# Patient Record
Sex: FEMALE | Race: WHITE | Employment: FULL TIME | URBAN - METROPOLITAN AREA
[De-identification: names, ages, dates, MRNs, and addresses within clinical notes are randomized per-mention and may not be internally consistent; named-entity substitution may affect disease eponyms.]

---

## 2018-10-02 ENCOUNTER — HOSPITAL ENCOUNTER (OUTPATIENT)
Dept: MAMMOGRAPHY | Age: 58
Discharge: HOME | End: 2018-10-02
Attending: FAMILY MEDICINE
Payer: MEDICARE

## 2018-10-02 DIAGNOSIS — Z12.31 VISIT FOR SCREENING MAMMOGRAM: ICD-10-CM

## 2018-10-02 PROCEDURE — 77067 SCR MAMMO BI INCL CAD: CPT

## 2019-10-25 ENCOUNTER — HOSPITAL ENCOUNTER (OUTPATIENT)
Dept: MAMMOGRAPHY | Age: 59
Discharge: HOME | End: 2019-10-25
Attending: FAMILY MEDICINE
Payer: COMMERCIAL

## 2019-10-25 DIAGNOSIS — Z12.31 VISIT FOR SCREENING MAMMOGRAM: ICD-10-CM

## 2019-10-25 PROCEDURE — 77067 SCR MAMMO BI INCL CAD: CPT

## 2021-05-24 ENCOUNTER — NURSE TRIAGE (OUTPATIENT)
Dept: OTHER | Facility: CLINIC | Age: 61
End: 2021-05-24

## 2021-05-24 NOTE — TELEPHONE ENCOUNTER
Call received on [address]. Brief description of triage: c/o SOB with exertion and fatigue. Pt was exposed to COVID 3 weeks ago but states she has had SOB in past due to Anemia and was due for labs to be drawn. Pt states she is trying to establish care with new provider. See assessment below. Triage indicates for patient to be seen in the next 3 days or be seen by ED or UCC. Care advice provided. Recommended using local department of health website for testing locations and up to date information. Caller verbalizes understanding, denies any other questions or concerns; instructed to call back for any new or worsening symptoms. Reason for Disposition   [1] Difficulty breathing (shortness of breath) occurs AND [2] onset > 14 days after COVID-19 EXPOSURE (Close Contact)   MODERATE longstanding difficulty breathing (e.g., speaks in phrases, SOB even at rest, pulse 100-120) and SAME as normal    Answer Assessment - Initial Assessment Questions  1. COVID-19 CLOSE CONTACT: \"Who is the person with the confirmed or suspected COVID-19 infection that you were exposed to?\"     Pt states neighbor    2. PLACE of CONTACT: \"Where were you when you were exposed to COVID-19? \" (e.g., home, school, medical waiting room; which city?)      Out side    3. TYPE of CONTACT: \"How much contact was there? \" (e.g., sitting next to, live in same house, work in same office, same building)      Pt states was close to pt within 3 minutes    4. DURATION of CONTACT: \"How long were you in contact with the COVID-19 patient? \" (e.g., a few seconds, passed by person, a few minutes, 15 minutes or longer, live with the patient)      Less than 15 minutes    5. MASK: \"Were you wearing a mask? \" \"Was the other person wearing a mask? \" Note: wearing a mask reduces the risk of an otherwise close contact. Pt denies    6. DATE of CONTACT: \"When did you have contact with a COVID-19 patient? \" (e.g., how many days ago)      Pt states about 3 weeks    7. COMMUNITY SPREAD: \"Are there lots of cases of COVID-19 (community spread) where you live? \" (See public health department website, if unsure)        Pt denies or is unusre    8. SYMPTOMS: \"Do you have any symptoms? \" (e.g., fever, cough, breathing difficulty, loss of taste or smell)      SOB and fatigue    9. PREGNANCY OR POSTPARTUM: \"Is there any chance you are pregnant? \" \"When was your last menstrual period? \" \"Did you deliver in the last 2 weeks? \"      Na    10. HIGH RISK: \"Do you have any heart or lung problems? \" \"Do you have a weak immune system? \" (e.g., heart failure, COPD, asthma, HIV positive, chemotherapy, renal failure, diabetes mellitus, sickle cell anemia, obesity)        Hx of anemia    11. TRAVEL: \"Have you traveled out of the country recently? \" If so, \"When and where? \" Also ask about out-of-state travel, since the [facility] has identified some high-risk cities for community spread in the [address] [place] [address] [address]. Note: Travel becomes less relevant if there is widespread community transmission where the patient lives.         Denies    Protocols used: CORONAVIRUS (COVID-19) EXPOSURE-ADULT-[place], BREATHING DIFFICULTY-ADULT-[place]

## 2021-06-28 ENCOUNTER — TRANSCRIBE ORDER (OUTPATIENT)
Dept: SCHEDULING | Age: 61
End: 2021-06-28

## 2021-06-28 ENCOUNTER — HOSPITAL ENCOUNTER (OUTPATIENT)
Dept: MAMMOGRAPHY | Age: 61
Discharge: HOME | End: 2021-06-28
Attending: FAMILY MEDICINE
Payer: COMMERCIAL

## 2021-06-28 DIAGNOSIS — Z12.31 SCREENING MAMMOGRAM FOR HIGH-RISK PATIENT: Primary | ICD-10-CM

## 2021-06-28 DIAGNOSIS — Z12.31 SCREENING MAMMOGRAM FOR HIGH-RISK PATIENT: ICD-10-CM

## 2021-06-28 PROCEDURE — 77067 SCR MAMMO BI INCL CAD: CPT

## 2021-11-21 NOTE — PERIOP NOTES
First name stated on voicemail; Left generalized message regarding COVID requirements, a COVID test needs to be completed in order to proceed with procedures at [facility]. Left message regarding curbside COVID swabbing at [facility] Monday, November 29th from [id number]. Call [facility] Endoscopy at [phone] Monday thru Friday with questions or concerns.

## 2021-11-22 NOTE — PERIOP NOTES
First name stated on voicemail; Left generalized message regarding COVID requirements, a COVID test needs to be completed in order to proceed with procedures at [facility]. Left message regarding curbside COVID swabbing at [facility] Monday, November 29th from [id number]. Call [facility] Endoscopy at [phone] Monday thru Friday with questions or concerns.

## 2021-11-29 ENCOUNTER — TRANSCRIBE ORDER (OUTPATIENT)
Dept: REGISTRATION | Age: 61
End: 2021-11-29

## 2021-11-29 ENCOUNTER — HOSPITAL ENCOUNTER (OUTPATIENT)
Dept: LAB | Age: 61
Discharge: HOME | End: 2021-11-29
Payer: COMMERCIAL

## 2021-11-29 DIAGNOSIS — Z01.812 PRE-PROCEDURAL LABORATORY EXAMINATIONS: ICD-10-CM

## 2021-11-29 DIAGNOSIS — Z01.812 PRE-PROCEDURAL LABORATORY EXAMINATIONS: Primary | ICD-10-CM

## 2021-11-29 PROCEDURE — U0005 INFEC AGEN DETEC AMPLI PROBE: HCPCS

## 2021-11-30 LAB
SARS-COV-2, XPLCVT: NOT DETECTED
SOURCE, COVRS: NORMAL

## 2021-12-01 ENCOUNTER — ANESTHESIA EVENT (OUTPATIENT)
Dept: ENDOSCOPY | Age: 61
End: 2021-12-01
Payer: COMMERCIAL

## 2021-12-01 ENCOUNTER — HOSPITAL ENCOUNTER (OUTPATIENT)
Age: 61
Setting detail: OUTPATIENT SURGERY
Discharge: HOME | End: 2021-12-01
Attending: INTERNAL MEDICINE | Admitting: INTERNAL MEDICINE
Payer: COMMERCIAL

## 2021-12-01 ENCOUNTER — ANESTHESIA (OUTPATIENT)
Dept: ENDOSCOPY | Age: 61
End: 2021-12-01
Payer: COMMERCIAL

## 2021-12-01 VITALS
OXYGEN SATURATION: 100 % | HEIGHT: 64 IN | HEART RATE: 74 BPM | DIASTOLIC BLOOD PRESSURE: 77 MMHG | RESPIRATION RATE: 11 BRPM | WEIGHT: 156.31 LBS | BODY MASS INDEX: 26.69 KG/M2 | SYSTOLIC BLOOD PRESSURE: 118 MMHG | TEMPERATURE: 97.6 F

## 2021-12-01 PROCEDURE — 74011250636 HC RX REV CODE- 250/636: Performed by: NURSE ANESTHETIST, CERTIFIED REGISTERED

## 2021-12-01 PROCEDURE — 76060000031 HC ANESTHESIA FIRST 0.5 HR: Performed by: INTERNAL MEDICINE

## 2021-12-01 PROCEDURE — 88305 TISSUE EXAM BY PATHOLOGIST: CPT

## 2021-12-01 PROCEDURE — 74011000250 HC RX REV CODE- 250: Performed by: NURSE ANESTHETIST, CERTIFIED REGISTERED

## 2021-12-01 PROCEDURE — 77030021593 HC FCPS BIOP ENDOSC BSC -A: Performed by: INTERNAL MEDICINE

## 2021-12-01 PROCEDURE — 74011250636 HC RX REV CODE- 250/636: Performed by: INTERNAL MEDICINE

## 2021-12-01 PROCEDURE — 77030011992 HC AIRWY NASOPHGL TELE -A: Performed by: STUDENT IN AN ORGANIZED HEALTH CARE EDUCATION/TRAINING PROGRAM

## 2021-12-01 PROCEDURE — 76040000019: Performed by: INTERNAL MEDICINE

## 2021-12-01 PROCEDURE — 2709999900 HC NON-CHARGEABLE SUPPLY: Performed by: INTERNAL MEDICINE

## 2021-12-01 RX ORDER — ATROPINE SULFATE 0.1 MG/ML
0.5 INJECTION INTRAVENOUS
Status: DISCONTINUED | OUTPATIENT
Start: 2021-12-01 | End: 2021-12-01 | Stop reason: HOSPADM

## 2021-12-01 RX ORDER — MIDAZOLAM HYDROCHLORIDE 1 MG/ML
.25-5 INJECTION, SOLUTION INTRAMUSCULAR; INTRAVENOUS
Status: DISCONTINUED | OUTPATIENT
Start: 2021-12-01 | End: 2021-12-01 | Stop reason: HOSPADM

## 2021-12-01 RX ORDER — DEXTROMETHORPHAN/PSEUDOEPHED 2.5-7.5/.8
1.2 DROPS ORAL
Status: DISCONTINUED | OUTPATIENT
Start: 2021-12-01 | End: 2021-12-01 | Stop reason: HOSPADM

## 2021-12-01 RX ORDER — PROPOFOL 10 MG/ML
INJECTION, EMULSION INTRAVENOUS AS NEEDED
Status: DISCONTINUED | OUTPATIENT
Start: 2021-12-01 | End: 2021-12-01 | Stop reason: HOSPADM

## 2021-12-01 RX ORDER — FAMOTIDINE 40 MG/1
40 TABLET, FILM COATED ORAL DAILY
COMMUNITY

## 2021-12-01 RX ORDER — SODIUM CHLORIDE 9 MG/ML
50 INJECTION, SOLUTION INTRAVENOUS CONTINUOUS
Status: DISCONTINUED | OUTPATIENT
Start: 2021-12-01 | End: 2021-12-01 | Stop reason: HOSPADM

## 2021-12-01 RX ORDER — ESOMEPRAZOLE MAGNESIUM 20 MG/1
FOR SUSPENSION ORAL DAILY
COMMUNITY

## 2021-12-01 RX ORDER — ESCITALOPRAM OXALATE 5 MG/1
5 TABLET ORAL DAILY
COMMUNITY

## 2021-12-01 RX ORDER — PROPOFOL 10 MG/ML
INJECTION, EMULSION INTRAVENOUS
Status: DISCONTINUED | OUTPATIENT
Start: 2021-12-01 | End: 2021-12-01 | Stop reason: HOSPADM

## 2021-12-01 RX ORDER — CETIRIZINE HCL 10 MG
TABLET ORAL DAILY
COMMUNITY

## 2021-12-01 RX ORDER — FENTANYL CITRATE 50 UG/ML
100 INJECTION, SOLUTION INTRAMUSCULAR; INTRAVENOUS
Status: DISCONTINUED | OUTPATIENT
Start: 2021-12-01 | End: 2021-12-01 | Stop reason: HOSPADM

## 2021-12-01 RX ORDER — LIDOCAINE HYDROCHLORIDE 20 MG/ML
INJECTION, SOLUTION EPIDURAL; INFILTRATION; INTRACAUDAL; PERINEURAL AS NEEDED
Status: DISCONTINUED | OUTPATIENT
Start: 2021-12-01 | End: 2021-12-01 | Stop reason: HOSPADM

## 2021-12-01 RX ORDER — NALOXONE HYDROCHLORIDE 0.4 MG/ML
0.4 INJECTION, SOLUTION INTRAMUSCULAR; INTRAVENOUS; SUBCUTANEOUS
Status: DISCONTINUED | OUTPATIENT
Start: 2021-12-01 | End: 2021-12-01 | Stop reason: HOSPADM

## 2021-12-01 RX ORDER — FLUMAZENIL 0.1 MG/ML
0.2 INJECTION INTRAVENOUS
Status: DISCONTINUED | OUTPATIENT
Start: 2021-12-01 | End: 2021-12-01 | Stop reason: HOSPADM

## 2021-12-01 RX ORDER — SODIUM CHLORIDE 9 MG/ML
INJECTION, SOLUTION INTRAVENOUS
Status: DISCONTINUED | OUTPATIENT
Start: 2021-12-01 | End: 2021-12-01 | Stop reason: HOSPADM

## 2021-12-01 RX ORDER — EPINEPHRINE 0.1 MG/ML
1 INJECTION INTRACARDIAC; INTRAVENOUS
Status: DISCONTINUED | OUTPATIENT
Start: 2021-12-01 | End: 2021-12-01 | Stop reason: HOSPADM

## 2021-12-01 RX ADMIN — FENTANYL CITRATE 50 MCG: 50 INJECTION, SOLUTION INTRAMUSCULAR; INTRAVENOUS at 08:08

## 2021-12-01 RX ADMIN — PROPOFOL 140 MCG/KG/MIN: 10 INJECTION, EMULSION INTRAVENOUS at 08:09

## 2021-12-01 RX ADMIN — LIDOCAINE HYDROCHLORIDE 20 MG: 20 INJECTION, SOLUTION INTRAVENOUS at 08:12

## 2021-12-01 RX ADMIN — SODIUM CHLORIDE 50 ML/HR: 9 INJECTION, SOLUTION INTRAVENOUS at 07:51

## 2021-12-01 RX ADMIN — LIDOCAINE HYDROCHLORIDE 80 MG: 20 INJECTION, SOLUTION INTRAVENOUS at 08:10

## 2021-12-01 RX ADMIN — PROPOFOL INJECTABLE EMULSION 20 MG: 10 INJECTION, EMULSION INTRAVENOUS at 08:11

## 2021-12-01 RX ADMIN — SODIUM CHLORIDE: 9 INJECTION, SOLUTION INTRAVENOUS at 07:30

## 2021-12-01 RX ADMIN — PROPOFOL INJECTABLE EMULSION 50 MG: 10 INJECTION, EMULSION INTRAVENOUS at 08:10

## 2021-12-01 NOTE — PERIOP NOTES
Care of patient assumed from the anesthesia provider. Patient tolerated procedure well. Abdomen remains soft and non tender post procedure, no complaints or indication of discomfort noted at this time. See anesthesia note.     Endoscope was pre-cleaned at bedside immediately following procedure by DIALLO Haider

## 2021-12-01 NOTE — H&P
Patient Name: David Rea  2021  Gender: Female   (age): 1960 (62)      [facility]  [address] [facility] [address]  [phone] (phone)  [phone] (fax)    Chief Complaint:    anemia, constipation, diarrhea     History of Present Illness:            Personal history includes anemia with recent hemoglobin 9.7. Long-term she has symptoms including that of alternating diarrhea, inability to initiate a bowel movement; she has not had fever, vomiting, visible blood loss. She has no history of regular use of anti-inflammatories including an absence of low-dose aspirin. Additional history includes that of indigestion, heartburn, regurgitation; she has been symptomatic still in spite of daily esomeprazole; finds significant improvement with the addition of famotidine. Currently takes both esomeprazole and famotidine daily    Past Medical History  Medical Conditions: Allergic rhinitis  GERD/Hiatal Hernia  High cholesterol  Surgical Procedures:   hernia  Tubal Ligation  Tonsillectomy  D and C  Laparoscopy  Dx Studies:   Colonoscopy, 10/27/2014, Polyp (3 mm) in the mid-ascending colon.  (Polypectomy)  Medications:   xiljqjiydobvu729 mgTake 1 tablet by mouth at bedtime as directed  Leticia-Steptoe Heartburn+Ors792-52 mgTake 1 tablet by mouth once a day as directed  bupropion WMm922 mgTake 1 tablet by mouth once a day as directed  oguuwmfmfq46 mgTake 1 tablet by mouth twice a day as directed  cranberry conc-ascorbic acid12,600-20 mgTake 2 capsule by mouth twice a day as directed  escitalopram oxalate5 mgTake 1 tablet by mouth once a day as directed  esomeprazole vfcacuuym03 mgTake 1 capsule by mouth at bedtime as directed  ccbvnashaf38 mgTake 1 tablet by mouth once a day as directed  hyoscyamine sulfate0.125 mgTake 1 tablet by mouth every four hours as needed  iron bisglycinate jumbxez44 mg ironTake 1 capsule by mouth once a day as directed  magnesium lkurq286 mg magnesiumTake 1 capsule by mouth once a day as directed  melatonin5 mgTake 1 tablet by mouth at bedtime as directed  brydjmgbb47 mgTake 1 tablet by mouth at bedtime as directed  lwltwrbs523 mgTake 1 tablet by mouth single dose as needed  ebkrriolyrk99 mgTake 1 tablet by mouth once a day as directed  gciuaxyovvv708 mgTake 1-2 capsule by mouth once a day as needed  Allergies:   Compazine - shaking muscle contractions  morphine - hives vomiting  Sulfa (Sulfonamide Antibiotics)  Immunizations:   Influenza vaccination, 11/01/2021  Flu vaccine  Influenza vaccination (refused)  Social History  Alcohol:   None  Tobacco:   Never smoker  Drugs:   None  Exercise:   Exercise less than 3 times a week. Caffeine:   Daily. Marital Status:    Unknown     Family History   No history of GI Cancers, Liver disease  Brother: Diagnosed with Crohn's disease or ulcerative colitis, Esophageal cancer; Son: Diagnosed with Crohn's disease or ulcerative colitis; Maternal Uncle: Diagnosed with colon cancer;  First Cousin: Diagnosed with colon cancer; Other: Diagnosed with Family history of colon cancer; Father: Diagnosed with Family history of colon polyps; Review of Systems:  Cardiovascular: Denies chest pain, irregular heart beat, palpitations, peripheral edema, syncope, Sweats. Constitutional: Denies fatigue, fever, loss of appetite, weight gain, weight loss. ENMT: Denies nose bleeds, sore throat, hearing loss. Endocrine: Denies excessive thirst, heat intolerance. Eyes: Denies loss of vision. Gastrointestinal: Presents suffers from abdominal pain, diarrhea, Bloating/gas, heartburn, stomach cramps. Denies abdominal swelling, change in bowel habits, constipation, jaundice, nausea, rectal bleeding, vomiting, dysphagia, rectal pain, Stool incontinence, hematemesis. Genitourinary: Denies dark urine, dysuria, frequent urination, hematuria, incontinence. Hematologic/Lymphatic: Denies easy bruising, prolonged bleeding.   Integumentary: Denies itching, rashes, sun sensitivity. Musculoskeletal: Denies arthritis, back pain, gout, joint pain, muscle weakness, stiffness. Neurological: Denies dizziness, fainting, frequent headaches, memory loss. Psychiatric: Denies anxiety, depression, difficulty sleeping, hallucinations, nervousness, panic attacks, paranoia. Respiratory: Denies cough, dyspnea, wheezing. Physical exam  Vital Signs: See nursing notes     Constitutional:  Appearance: No distress, appears comfortable. Skin:  Inspection: No rash, no jaundice. Head/face: Inspection: Normacephalic, atraumatic. Eyes:  Conjunctivae/lids: Normal.  Lungs clear to percussion and auscultation  Cardiac regular rate and rhythm  Abdomen no distention, mass lesion, tenderness  Psychiatric:  Judgment/insight: Normal, normal judgement, normal insight. Impressions:   Gastroesophageal reflux disease (GERD)  Anemia, unspecified    Plan:   I've discussed EGD, colonoscopy possible biopsy, polypectomy, cautery, injection, alternatives, complications including but not limited to pain, cardiopulmonary event, bleeding, perforation requiring additional blood transfusion or operative repair; all questions answered.

## 2021-12-01 NOTE — PROCEDURES
Caesar Sebastian M.D. 2021    Esophagogastroduodenoscopy (EGD) Colonoscopy Procedure Note  Angelika Mendoza  : 1960  [address] [place] [address] Medical Record Number: [id number]      Indications:    GERD, anemia, diarrhea  Referring Physician:  Jaime Connolly MD  Anesthesia/Sedation: see nursing notes  Endoscopist:  Dr. Lew Hernandez  Assistants: None  Permit:  The indications, risks, benefits and alternatives were reviewed with the patient or their decision maker who was provided an opportunity to ask questions and all questions were answered. The specific risks of esophagogastroduodenoscopy with conscious sedation were reviewed, including but not limited to anesthetic complication, bleeding, adverse drug reaction, missed lesion, infection, IV site reactions, and intestinal perforation which would lead to the need for surgical repair. Alternatives to EGD including radiographic imaging, observation without testing, or laboratory testing were reviewed as well as the limitations of those alternatives discussed. After considering the options and having all their questions answered, the patient or their decision maker provided both verbal and written consent to proceed. Procedure in Detail:  After obtaining informed consent, positioning of the patient in the left lateral decubitus position, and conduction of a pre-procedure pause or \"time out\" the endoscope was introduced into the mouth and advanced to the third portion duodenum. A careful inspection was made, and findings or interventions are described below.     Findings:   Esophagus:normal  Stomach: no mucosal lesion appreciated and hiatal hernia 32-40 cm: large  Duodenum/jejunum: normal    Complications/estimated blood loss: none    Therapies:  none    Specimens: none    Implants: none           Endoscopist:  Dr. Lew Hernandez  Assistants: None  Complications: None  Estimate Blood Loss:  None    Colonoscopy is to follow    Permit:  The indications, risks, benefits and alternatives were reviewed with the patient or their decision maker who was provided an opportunity to ask questions and all questions were answered. The specific risks of colonoscopy with conscious sedation were reviewed, including but not limited to anesthetic complication, bleeding, adverse drug reaction, missed lesion, infection, IV site reactions, and intestinal perforation which would lead to the need for surgical repair. Alternatives to colonoscopy including radiographic imaging, observation without testing, or laboratory testing were reviewed including the limitations of those alternatives. After considering the options and having all their questions answered, the patient or their decision maker provided both verbal and written consent to proceed. Procedure in Detail:  After obtaining informed consent, positioning of the patient in the left lateral decubitus position, and conduction of a pre-procedure pause or \"time out\" the endoscope was introduced into the anus and advanced to the cecum, which was identified by the ileocecal valve and appendiceal orifice. The quality of the colonic preparation was good. A careful inspection was made as the colonoscope was withdrawn, findings and interventions are described below. Appendiceal orifice photographed    Findings:   normal  no mucosal lesion appreciated    Specimens:    random colon biopsies    Implants: none    Complications:   None; patient tolerated the procedure well. Estimated blood loss: none    Impression:  hiatal hernia; otherwise normal exams    Recommendations:      - high fiber diet; continue current medications   Because of age, routine follow up exam not recommended    Thank you for entrusting me with this patient's care.   Please do not hesitate to contact me with any questions or if I can be of assistance with any of your other patients' GI needs.     Signed By: Kaylin Bates MD                        December 1, 2021

## 2021-12-01 NOTE — PERIOP NOTES
Chelsi Piseco  1960  [id number]    Situation:  Verbal report received from: Krish Morgan RN  Procedure: Procedure(s):  COLONOSCOPY  ESOPHAGOGASTRODUODENOSCOPY (EGD)  COLON BIOPSY    Background:    Preoperative diagnosis: Anemia, unspecified type [D64.9]  Gastroesophageal reflux disease, unspecified whether esophagitis present [K21.9]  Postoperative diagnosis: Hiatal hernia  normal colon    :  Dr. Bianca Singh  Assistant(s): Endoscopy Technician-1: Marilu Corral  Endoscopy RN-1: Tan Allen RN    Specimens:   ID Type Source Tests Collected by Time Destination   1 : colon biopsies Preservative   Managricelda Tucker MD 12/1/2021 4114 Pathology     H. Pylori  no    Assessment:  Intra-procedure medications   Anesthesia gave intra-procedure sedation and medications, see anesthesia flow sheet yes    Intravenous fluids: NS@ KVO     Vital signs stable  yes    Abdominal assessment: round and soft yes    Recommendation:  Discharge patient per MD order yes.   Family or Friend yes  Permission to share finding with family or friend yes

## 2021-12-01 NOTE — DISCHARGE INSTRUCTIONS
Roxanne Meetpancho  [id number]  1960    DISCHARGE INSTRUCTIONS    Results:  Impression:  hiatal hernia; otherwise normal exams    Recommendations:      - high fiber diet; continue current medications   Because of age, routine follow up exam not recommended    Discomfort:  Redness at IV site- apply warm compress to area; if redness or soreness persist- contact your physician. There may be a slight amount of blood passed from the rectum. Gaseous discomfort - walking, belching will help relieve any discomfort. You may not operate a vehicle for 12 hours. You may not engage in an occupation involving machinery or appliances for rest of today. You may not drink alcoholic beverages for at least 12 hours. Avoid making any critical decisions for at least 24 hours. DIET:   High fiber diet. Medications:                Resume usual medications today   ACTIVITY:  You may resume your normal daily activities it is recommended that you spend the remainder of the day resting -  avoid any strenuous activity. CALL M.D. ANY SIGN OF:   Increasing pain, nausea, vomiting  Abdominal distension (swelling)  New increased bleeding (oral or rectal)  Fever (chills)  Pain in chest area  Bloody discharge from nose or mouth  Shortness of breath     Follow-up Instructions:  Call Dr. Agustín Flores if you have any questions or problems.           DISCHARGE SUMMARY from Nurse    The following personal items collected during your admission are returned to you:   Dental Appliance:    Vision: Visual Aid: None  Hearing Aid:    Jewelry:    Clothing:    Other Valuables:    Valuables sent to safe:

## 2021-12-01 NOTE — ANESTHESIA POSTPROCEDURE EVALUATION
Procedure(s):  COLONOSCOPY  ESOPHAGOGASTRODUODENOSCOPY (EGD)  COLON BIOPSY. MAC    Anesthesia Post Evaluation      Multimodal analgesia: multimodal analgesia used between 6 hours prior to anesthesia start to PACU discharge  Patient location during evaluation: PACU  Patient participation: complete - patient participated  Level of consciousness: awake and alert  Pain management: adequate  Airway patency: patent  Anesthetic complications: no  Cardiovascular status: acceptable  Respiratory status: acceptable  Hydration status: acceptable  Post anesthesia nausea and vomiting:  none  Final Post Anesthesia Temperature Assessment:  Normothermia (36.0-37.5 degrees C)      INITIAL Post-op Vital signs:   Vitals Value Taken Time   /77 12/01/21 0901   Temp 36.4 °C (97.6 °F) 12/01/21 0842   Pulse 73 12/01/21 0903   Resp 14 12/01/21 0903   SpO2 100 % 12/01/21 0903   Vitals shown include unvalidated device data.

## 2021-12-01 NOTE — ANESTHESIA PREPROCEDURE EVALUATION
Relevant Problems   No relevant active problems       Anesthetic History     PONV          Review of Systems / Medical History  Patient summary reviewed, nursing notes reviewed and pertinent labs reviewed    Pulmonary          Undiagnosed apnea    Pertinent negatives: No COPD, asthma and recent URI     Neuro/Psych         Psychiatric history     Cardiovascular                  Exercise tolerance: >4 METS     GI/Hepatic/Renal     GERD      Hiatal hernia     Endo/Other             Other Findings              Physical Exam    Airway  Mallampati: III  TM Distance: 4 - 6 cm  Neck ROM: normal range of motion   Mouth opening: Normal     Cardiovascular  Regular rate and rhythm,  S1 and S2 normal,  no murmur, click, rub, or gallop             Dental    Dentition: Lower dentition intact and Upper dentition intact     Pulmonary  Breath sounds clear to auscultation               Abdominal         Other Findings            Anesthetic Plan    ASA: 2  Anesthesia type: MAC          Induction: Intravenous  Anesthetic plan and risks discussed with: Patient

## 2022-06-24 ENCOUNTER — TELEPHONE (OUTPATIENT)
Dept: SURGERY | Age: 62
End: 2022-06-24

## 2022-06-24 NOTE — TELEPHONE ENCOUNTER
Patient called and requested that someone help with getting her mammograms sent over to [address]. Called back and LVM that she called the wrong place (we have never seen her before) and provided phone number to [address] Kelsea Bermeo at [facility].

## 2023-03-07 ENCOUNTER — PATIENT MESSAGE (OUTPATIENT)
Dept: OBGYN CLINIC | Age: 63
End: 2023-03-07

## 2023-03-10 ENCOUNTER — OFFICE VISIT (OUTPATIENT)
Dept: OBGYN CLINIC | Age: 63
End: 2023-03-10

## 2023-03-10 VITALS
BODY MASS INDEX: 25.23 KG/M2 | HEIGHT: 64 IN | WEIGHT: 147.8 LBS | SYSTOLIC BLOOD PRESSURE: 126 MMHG | HEART RATE: 88 BPM | DIASTOLIC BLOOD PRESSURE: 82 MMHG

## 2023-03-10 DIAGNOSIS — Z01.419 ENCOUNTER FOR GYNECOLOGICAL EXAMINATION (GENERAL) (ROUTINE) WITHOUT ABNORMAL FINDINGS: Primary | ICD-10-CM

## 2023-03-10 DIAGNOSIS — Z12.4 CERVICAL CANCER SCREENING: ICD-10-CM

## 2023-03-10 DIAGNOSIS — N81.10 FEMALE BLADDER PROLAPSE: ICD-10-CM

## 2023-03-10 DIAGNOSIS — N39.41 URGE INCONTINENCE OF URINE: ICD-10-CM

## 2023-03-10 DIAGNOSIS — Z11.51 ENCOUNTER FOR SCREENING FOR HUMAN PAPILLOMAVIRUS (HPV): ICD-10-CM

## 2023-03-10 DIAGNOSIS — Z87.440 PERSONAL HISTORY UTI: ICD-10-CM

## 2023-03-10 LAB
BILIRUB UR QL STRIP: NEGATIVE
GLUCOSE UR-MCNC: NEGATIVE MG/DL
KETONES P FAST UR STRIP-MCNC: NEGATIVE MG/DL
PH UR STRIP: 6 [PH] (ref 4.6–8)
PROT UR QL STRIP: NEGATIVE
SP GR UR STRIP: 1 (ref 1–1.03)
UA UROBILINOGEN AMB POC: NORMAL (ref 0.2–1)
URINALYSIS CLARITY POC: CLEAR
URINALYSIS COLOR POC: YELLOW
URINE BLOOD POC: NEGATIVE
URINE LEUKOCYTES POC: NEGATIVE
URINE NITRITES POC: NEGATIVE

## 2023-03-10 RX ORDER — CHOLECALCIFEROL (VITAMIN D3) 125 MCG
CAPSULE ORAL
COMMUNITY

## 2023-03-10 RX ORDER — MULTIVIT WITH IRON,MINERALS
1 TABLET ORAL DAILY
COMMUNITY

## 2023-03-10 NOTE — PROGRESS NOTES
Elaina Campbell is a 65 y.o. female returns for an annual exam     Chief Complaint   Patient presents with    Well Woman       Patient's last menstrual period was 07/01/2009. Her periods are none in flow  She does not have dysmenorrhea. Problems: bladder prolapse, pt reports urinary incontinence. Pt does not care to have a pessary. Has not tried pelvic PT. Denies pain. Pt reports she is prone to UTI's, denies concerns for UTI currently, last UTI was 1 month ago, she goes to telehealth visits for txt. Pt does not have a urologist.   Birth Control: tubal ligation and post menopausal status. Last Pap: normal obtained 2012  She does not have a history of SETH 2, 3 or cervical cancer. Last Mammogram: had a recent mammogram 6/2021 which was negative for malignancy. It was normal. Pt reports she had another mammo at [facility] 6/2022 WNL.  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  Last Bone Density: never obtained, pt reports father with osteopenia & mother with osteoporosis   Last colonoscopy: 12/1/21 WNL      Examination chaperoned by Ramesh Brito MA.

## 2023-03-10 NOTE — PROGRESS NOTES
[address] [facility]  [email or web address]  [phone]    Rodney Olivo MD, [address] [facility]     Annual Gynecologic Exam:  Chief Complaint   Patient presents with    Well Woman       Rebecca Hartman is a No obstetric history on file. ,  65 y.o. female   Patient's last menstrual period was 07/01/2009. She presents for her annual checkup. She is having significant prolapse and ho UTI . Per Rooming Note:  Patient's last menstrual period was 07/01/2009. Her periods are none in flow  She does not have dysmenorrhea. Problems: bladder prolapse, pt reports urinary incontinence. Pt does not care to have a pessary. Has not tried pelvic PT. Denies pain. Pt reports she is prone to UTI's, denies concerns for UTI currently, last UTI was 1 month ago, she goes to telehealth visits for txt. Pt does not have a urologist.   Birth Control: tubal ligation and post menopausal status. Last Pap: normal obtained 2012  She does not have a history of SETH 2, 3 or cervical cancer. Last Mammogram: had a recent mammogram 6/2021 which was negative for malignancy. It was normal. Pt reports she had another mammo at [facility] 6/2022 WNL.  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  Last Bone Density: never obtained, pt reports father with osteopenia & mother with osteoporosis   Last colonoscopy: 12/1/21 WNL    Sexual history and Contraception:  Social History     Substance and Sexual Activity   Sexual Activity Yes    Partners: Male    Birth control/protection: None       Past Medical History:   Diagnosis Date    Bladder cystocele- stress urinary incontinence 08/25/2010    GERD (gastroesophageal reflux disease) 08/25/2010    Hiatal hernia 08/25/2010    & saw again at colonoscopy 2021    IBD (inflammatory bowel disease) 08/25/2010    Irritable Bowel Disease    PMS (premenstrual syndrome) 08/25/2010    Psychiatric disorder     anxiety     Current Outpatient Medications   Medication Sig    ferrous fumarate/vit Bcomp,C (SUPER B COMPLEX PO) Take  by mouth.    cod liver oiL cap Take 1 Capsule by mouth daily. cholecalciferol, vitamin D3, (Vitamin D3) 50 mcg (2,000 unit) tab Take  by mouth. OTHER Beet Root tablet    cetirizine (ZYRTEC) 10 mg tablet Take  by mouth daily. Esomeprazole Magnesium Take  by mouth daily. famotidine (PEPCID) 40 mg tablet Take 40 mg by mouth daily. escitalopram oxalate (LEXAPRO) 5 mg tablet Take 5 mg by mouth daily. buPROPion SR (WELLBUTRIN SR) 150 mg SR tablet TAKE 1 TABLET BY MOUTH TWICE A DAY    pravastatin (PRAVACHOL) 40 mg tablet Take 1 Tab by mouth daily. hyoscyamine SL (LEVSIN/SL) 0.125 mg SL tablet 1 Tab by SubLINGual route every four (4) hours as needed. MELATONIN PO Take  by mouth. CRANBERRY EXTRACT (CRANBERRY PO) Take  by mouth. CALCIUM CARBONATE/VITAMIN D2 (CALCIUM + VITAMIN D PO) Take  by mouth. diphenhydrAMINE (BENADRYL) 25 mg capsule Take 50 mg by mouth nightly as needed. (Patient not taking: Reported on 12/1/2021)    omeprazole (PRILOSEC) 40 mg capsule Take 1 Cap by mouth two (2) times a day. (Patient not taking: Reported on 12/1/2021)     No current facility-administered medications for this visit. OB History   No obstetric history on file.      Past Surgical History:   Procedure Laterality Date    COLONOSCOPY N/A 12/1/2021    COLONOSCOPY performed by Renuka Newell MD at [facility] ENDOSCOPY    HX BREAST BIOPSY Right 1980    Surgical Bx - Benign - fibroadenoma - per pt    HX BREAST BIOPSY Right 1999    Surgical Bx - Benign - fibroadenoma - per pt    HX DILATION AND CURETTAGE      HX GYN Right     ovary removed    HX HERNIA REPAIR      incisional     HX TUBAL LIGATION       Family History   Problem Relation Age of Onset    Breast Cancer Paternal Aunt         66's    Breast Cancer Cousin         52's     Social History     Socioeconomic History    Marital status:      Spouse name: Not on file Number of children: Not on file    Years of education: Not on file    Highest education level: Not on file   Occupational History    Not on file   Tobacco Use    Smoking status: Never    Smokeless tobacco: Never   Vaping Use    Vaping Use: Never used   Substance and Sexual Activity    Alcohol use: No    Drug use: No    Sexual activity: Yes     Partners: Male     Birth control/protection: None   Other Topics Concern    Not on file   Social History Narrative    Not on file     Social Determinants of Health     Financial Resource Strain: Not on file   Food Insecurity: Not on file   Transportation Needs: Not on file   Physical Activity: Not on file   Stress: Not on file   Social Connections: Not on file   Intimate Partner Violence: Not on file   Housing Stability: Not on file     Tobacco History:  reports that she has never smoked. She has never used smokeless tobacco.  Alcohol Abuse:  reports no history of alcohol use. Drug Abuse:  reports no history of drug use.   Allergies   Allergen Reactions    Adhesive Tape-Silicones Rash    Compazine [Prochlorperazine Edisylate] Other (comments)     Extrapyramidal sxs    Morphine Hives and Nausea and Vomiting    Sulfa (Sulfonamide Antibiotics) Nausea Only       Patient Active Problem List   Diagnosis Code    PMS (premenstrual syndrome) N94.3    GERD (gastroesophageal reflux disease) K21.9    Hiatal hernia K44.9    Bladder cystocele- stress urinary incontinence [id number]    IBD (inflammatory bowel disease) K52.9       Review of Systems - History obtained from the patient and patient filled out questionnaire   Constitutional/general, HEENT, CV, Resp, GI, MSK, Neuro, Psych, Heme/lymph, Skin, Breast ROS: no significant complaints except as noted on HPI    Physical Exam  Visit Vitals  /82   Pulse 88   Ht 5' 3.5\" (1.613 m)   Wt 147 lb 12.8 oz (67 kg)   LMP 07/01/2009   BMI 25.77 kg/m²       Constitutional  Appearance: well-nourished, well developed, alert, in no acute distress    HENT  Head and Face: appears normal    Neck  Inspection/Palpation: normal appearance, no masses or tenderness  Lymph Nodes: no lymphadenopathy present  Thyroid: gland size normal, nontender, no nodules or masses present on palpation    Chest  Respiratory Effort: breathing unlabored  Auscultation: normal breath sounds    Cardiovascular  Heart: Auscultation: regular rate and rhythm without murmur    Breasts  Inspection of Breasts: breasts symmetrical, no skin changes, no discharge present, nipple appearance normal, no skin retraction present  Palpation of Breasts and Axillae: no masses present on palpation, no breast tenderness  Axillary Lymph Nodes: no lymphadenopathy present    Gastrointestinal  Abdominal Examination: abdomen non-tender to palpation, normal bowel sounds, no masses present  Liver and spleen: no hepatomegaly present, spleen not palpable  Hernias: no hernias identified    Genitourinary  External Genitalia: normal appearance for age, no discharge present, no tenderness present, no inflammatory lesions present, no masses present  Vagina: normal vaginal vault with anterior/utero/vaginal > posterior laxity defects, minimal discharge present, no inflammatory lesions present, no masses present  Bladder: non-tender to palpation  Urethra: appears normal  Cervix: normal   Uterus: normal size, shape and consistency  Adnexa: no adnexal tenderness present, no adnexal masses present  Perineum: perineum within normal limits, no evidence of trauma, no rashes or skin lesions present  Anus: anus within normal limits, no hemorrhoids present  Inguinal Lymph Nodes: no lymphadenopathy present    Skin  General Inspection: no rash, no lesions identified    Neurologic/Psychiatric  Mental Status:  Orientation: grossly oriented to person, place and time  Mood and Affect: mood normal, affect appropriate    Assessment:  65 y.o. No obstetric history on file.  for well woman exam  Her current medical status is satisfactory with no evidence of significant gynecologic issues. Encounter Diagnoses   Name Primary? Encounter for gynecological examination (general) (routine) without abnormal findings Yes    Cervical cancer screening     Encounter for screening for human papillomavirus (HPV)     Personal history UTI     Female bladder prolapse     Urge incontinence of urine        Plan:  I recommended follow up one year for routine annual gynecologic exam or sooner prn  Patient should follow up with a primary care physician for chronic medical problems and evaluation of non-gynecologic concerns and to please contact our office with any GYN questions or concerns. I recommend STD testing per CDC guidelines and at patient request.   We discussed treatment options for prolapse vs observation. The patient will start with home pelvic floor exercises: handout given  We discussed option of incorporating physical therapy and other options including but not limited to surgery, pessary fitting, over the counter inserts and other alternatives. I recommended avoiding strain to the pelvic floor, bowel regimen if needed and using proper lifting techniques. Since obesity and tobacco use can also be risk factors for prolapse, I advised avoiding/reversing these risk factors to help with management options.   Additional ACOG information: [email or web address]  Consider PT  Fu 2-3 mos  Rec avoid strain  Disc OTC options for incontinence        Folllow up:  [x] return for annual well woman exam in one year or sooner if she is having problems  [] follow up and ultrasound  [] 6 months  [] 3 months  [] 6 weeks   [] 1 month    Orders Placed This Encounter    AMB POC URINALYSIS DIP STICK MANUAL W/O MICRO    PAP IG, APTIMA HPV AND RFX 16/18,45 ([id number])       Results for orders placed or performed in visit on 03/10/23   AMB POC URINALYSIS DIP STICK MANUAL W/O MICRO   Result Value Ref Range    Color (UA POC) Yellow     Clarity (UA POC) Clear     Glucose (UA POC) Negative Negative    Bilirubin (UA POC) Negative Negative    Ketones (UA POC) Negative Negative    Specific gravity (UA POC) 1.005 1.001 - 1.035    Blood (UA POC) Negative Negative    pH (UA POC) 6.0 4.6 - 8.0    Protein (UA POC) Negative Negative    Urobilinogen (UA POC) normal 0.2 - 1    Nitrites (UA POC) Negative Negative    Leukocyte esterase (UA POC) Negative Negative

## 2023-06-23 ENCOUNTER — OFFICE VISIT (OUTPATIENT)
Age: 63
End: 2023-06-23

## 2023-06-23 VITALS
HEART RATE: 85 BPM | DIASTOLIC BLOOD PRESSURE: 87 MMHG | HEIGHT: 64 IN | SYSTOLIC BLOOD PRESSURE: 134 MMHG | WEIGHT: 149.4 LBS | BODY MASS INDEX: 25.51 KG/M2

## 2023-06-23 DIAGNOSIS — R35.0 URINARY FREQUENCY: ICD-10-CM

## 2023-06-23 DIAGNOSIS — N39.0 RECURRENT UTI: ICD-10-CM

## 2023-06-23 DIAGNOSIS — R32 URINARY INCONTINENCE, UNSPECIFIED TYPE: Primary | ICD-10-CM

## 2023-06-23 DIAGNOSIS — Z87.440 PERSONAL HISTORY UTI: ICD-10-CM

## 2023-06-23 LAB
BILIRUBIN, URINE, POC: NEGATIVE
BLOOD URINE, POC: NEGATIVE
GLUCOSE URINE, POC: NEGATIVE
KETONES, URINE, POC: NEGATIVE
LEUKOCYTE ESTERASE, URINE, POC: NEGATIVE
NITRITE, URINE, POC: NEGATIVE
PH, URINE, POC: 6 (ref 4.6–8)
PROTEIN,URINE, POC: NEGATIVE
SPECIFIC GRAVITY, URINE, POC: 1.02 (ref 1–1.03)
URINALYSIS CLARITY, POC: CLEAR
URINALYSIS COLOR, POC: YELLOW
UROBILINOGEN, POC: NORMAL

## 2023-06-23 NOTE — PROGRESS NOTES
Rooming note, Ob/Gyn follow up visit. Chief Complaint   Patient presents with    Follow-up     prolapse     Jake Sweet is a 65 y.o. female presents for a follow up visit. No LMP recorded. Patient is postmenopausal.  Birth Control: tubal ligation and post menopausal status. Last Pap: approximate date 3/10/23 and was normal  Last or next WWE is: 3/10/23    The patient is here for fu from 3/10/23 for prolapse. Pt declined pessary last visit. Pt is prone to UTI's per last visit note. Pelvic floor exercise ho given last visit. Last visit pt was going to consider PT & avoiding straining, & discussed OTC options for incontinence. Pt reports dribbling & urinary frequency, pt reports she wants to try an rx for overactive bladder that she can use prn when going on a trip or being busy during the day. In the past she tried [facility] [place] and Ray & that caused constipation & dry mouth. Pt has not tried any pelvic floor exercises since last visit. Pt has not had intercourse since last visit. Pt unsure if prolapse is better or worse. Mammo today      She does not have other concerns.

## 2023-06-23 NOTE — PROGRESS NOTES
[address] [facility]  [email or web address]  [email or web address]  [phone]    Ygnacio Bernheim, MD, [address] [facility]      OB/GYN Problem visit    HPI  Bobbi Baer is a , 65 y.o. female who presents for a problem visit. Chief Complaint   Patient presents with    Follow-up     prolapse    Other     Urination problems       Pt reports still having urinary sx, not doing pelvic floor exercises regularly. Per Rooming Note:  No LMP recorded. Patient is postmenopausal.  Birth Control: tubal ligation and post menopausal status. Last Pap: approximate date 3/10/23 and was normal  Last or next WWE is: 3/10/23     The patient is here for fu from 3/10/23 for prolapse. Pt declined pessary last visit. Pt is prone to UTI's per last visit note. Pelvic floor exercise ho given last visit. Last visit pt was going to consider PT & avoiding straining, & discussed OTC options for incontinence. Pt reports dribbling & urinary frequency, pt reports she wants to try an rx for overactive bladder that she can use prn when going on a trip or being busy during the day. In the past she tried [facility] [place] and Ray & that caused constipation & dry mouth. Pt has not tried any pelvic floor exercises since last visit. Pt has not had intercourse since last visit. Pt unsure if prolapse is better or worse.       Mammo today       Sexual history and Contraception:  Social History     Substance and Sexual Activity   Sexual Activity Not Currently    Partners: Male    Birth control/protection: Post-menopausal       Past Medical History:   Diagnosis Date    Bladder cystocele 2010    GERD (gastroesophageal reflux disease) 2010    Hiatal hernia 2010    & saw again at colonoscopy     IBD (inflammatory bowel disease) 2010    Irritable Bowel Disease    Pap smear for cervical cancer screening 03/10/2023    wnl/hpv neg    PMS (premenstrual syndrome) 2010

## 2024-06-26 ENCOUNTER — HOSPITAL ENCOUNTER (OUTPATIENT)
Facility: HOSPITAL | Age: 64
Discharge: HOME | End: 2024-06-29
Payer: COMMERCIAL

## 2024-06-26 VITALS — BODY MASS INDEX: 28.32 KG/M2 | HEIGHT: 61 IN | WEIGHT: 150 LBS

## 2024-06-26 DIAGNOSIS — Z12.31 VISIT FOR SCREENING MAMMOGRAM: ICD-10-CM

## 2024-06-26 PROCEDURE — 77063 BREAST TOMOSYNTHESIS BI: CPT

## 2024-08-26 ENCOUNTER — OFFICE VISIT (OUTPATIENT)
Age: 64
End: 2024-08-26
Payer: COMMERCIAL

## 2024-08-26 VITALS
DIASTOLIC BLOOD PRESSURE: 76 MMHG | SYSTOLIC BLOOD PRESSURE: 127 MMHG | HEART RATE: 101 BPM | BODY MASS INDEX: 29.4 KG/M2 | WEIGHT: 155.6 LBS

## 2024-08-26 DIAGNOSIS — Z01.419 ENCOUNTER FOR GYNECOLOGICAL EXAMINATION: Primary | ICD-10-CM

## 2024-08-26 DIAGNOSIS — Z82.62 FHX: OSTEOPOROSIS: ICD-10-CM

## 2024-08-26 PROCEDURE — 99459 PELVIC EXAMINATION: CPT | Performed by: OBSTETRICS & GYNECOLOGY

## 2024-08-26 PROCEDURE — 99396 PREV VISIT EST AGE 40-64: CPT | Performed by: OBSTETRICS & GYNECOLOGY

## 2024-08-26 RX ORDER — EZETIMIBE 10 MG/1
10 TABLET ORAL DAILY
COMMUNITY

## 2024-08-26 RX ORDER — MULTIVITAMIN WITH IRON
1 TABLET ORAL DAILY
COMMUNITY

## 2024-08-26 RX ORDER — AMLODIPINE BESYLATE 2.5 MG/1
2.5 TABLET ORAL DAILY
COMMUNITY

## 2024-08-26 RX ORDER — COVID-19 ANTIGEN TEST
KIT MISCELLANEOUS
COMMUNITY
Start: 2017-10-27

## 2024-08-26 RX ORDER — OMEPRAZOLE 40 MG/1
40 CAPSULE, DELAYED RELEASE ORAL
COMMUNITY
Start: 2013-11-08

## 2024-08-26 RX ORDER — CALCIUM CARBONATE 300MG(750)
400 TABLET,CHEWABLE ORAL DAILY
COMMUNITY

## 2024-08-26 NOTE — PROGRESS NOTES
Tish Rivas is a 63 y.o. female returns for an annual exam     Chief Complaint   Patient presents with    Annual Exam       No LMP recorded. Patient is postmenopausal.  Her periods are none.   Problems:   Birth Control: abstinence. BTL & post menopausal.   Last Pap: 3/10/23 WNL  She does not have a history of RIGO 2, 3 or cervical cancer.   Last Mammogram: 6/26/2024 WNL  Last Bone Density: never obtained, interested in getting one d/t being on nexium  Last colonoscopy: 2021        Examination chaperoned by Melany Robertson MA.

## 2024-08-26 NOTE — PROGRESS NOTES
education: Not on file    Highest education level: Not on file   Occupational History    Not on file   Tobacco Use    Smoking status: Never    Smokeless tobacco: Never   Vaping Use    Vaping status: Never Used   Substance and Sexual Activity    Alcohol use: No    Drug use: No    Sexual activity: Not Currently     Partners: Male     Birth control/protection: Post-menopausal, Abstinence, Female Sterilization     Comment: BTL   Other Topics Concern    Not on file   Social History Narrative    Not on file     Social Determinants of Health     Financial Resource Strain: Not on file   Food Insecurity: Not on file   Transportation Needs: Not on file   Physical Activity: Not on file   Stress: Not on file   Social Connections: Not on file   Intimate Partner Violence: Not At Risk (3/9/2023)    Humiliation, Afraid, Rape, and Kick questionnaire     Fear of Current or Ex-Partner: No     Emotionally Abused: No     Physically Abused: No     Sexually Abused: No   Housing Stability: Not on file     Tobacco History:  reports that she has never smoked. She has never used smokeless tobacco.  Alcohol Abuse:  reports no history of alcohol use.  Drug Abuse:  reports no history of drug use.  Allergies:   Allergies   Allergen Reactions    Prochlorperazine Edisylate Other (See Comments)     Extrapyramidal sxs    Sulfa Antibiotics Nausea Only    Adhesive Tape Rash    Morphine Hives and Nausea And Vomiting     Patient Active Problem List   Diagnosis    GERD (gastroesophageal reflux disease)    Hiatal hernia    PMS (premenstrual syndrome)    IBD (inflammatory bowel disease)       Review of Systems - History obtained from the patient and patient filled out questionnaire   Constitutional/general, HEENT, CV, Resp, GI, MSK, Neuro, Psych, Heme/lymph, Skin, Breast ROS: no significant complaints except as noted on HPI    Physical Exam  /76   Pulse (!) 101   Wt 70.6 kg (155 lb 9.6 oz)   BMI 29.40 kg/m²     Constitutional  Appearance: alert, in  no acute distress    HENT  Head and Face: appears normal    Neck  Inspection/Palpation: normal appearance    Breasts  Inspection of Breasts: breasts symmetrical, no skin changes, no discharge present, nipple appearance normal, no skin retraction present  Palpation of Breasts and Axillae: no masses present on palpation, no breast tenderness  Axillary Lymph Nodes: no lymphadenopathy present    Gastrointestinal  Abdominal Examination: abdomen non-tender to palpation, no masses present    Genitourinary  External Genitalia: normal appearance for age  Vagina: normal vaginal vault,  Bladder: non-tender to palpation  Urethra: appears normal  Cervix: normal, non tender   Uterus: normal, non tender  Adnexa: no adnexal tenderness present, no adnexal masses present  Perineum: normal appearing  Anus: normal appearing    Skin  General Inspection: no significant rash    Neurologic/Psychiatric  Mental Status:  Orientation: grossly oriented and alert  Mood and Affect: mood normal, affect appropriate    Assessment:  63 y.o.  for annual gynecologic exam.  Encounter Diagnoses   Name Primary?    Encounter for gynecological examination Yes    FHx: osteoporosis        Plan:  Recommend follow up one year for routine annual gynecologic exam or sooner as needed for any GYN concerns.  Patient should follow up with a primary care physician for chronic medical problems and evaluation of non-gynecologic concerns.  STD testing recommended per CDC guidelines and at patient request.   Follow up: annual gynecologic exam one year.  Disc options for prolapse add PT if desired vs other management options if more symptoms  Rec avoiding bladder strain/irritants  Pelvic floor exercises  BMD early baseline due to RF and FH: rec check with insurance re cost/coverage     Folllow up:  [x] return for annual well woman exam in one year or sooner if the patient is having problems  [] follow up and ultrasound  [] 6 months  [] 3 months  [] 6 weeks   [] 1

## 2025-03-30 NOTE — PERIOP NOTES
Endoscopy discharge instructions have been reviewed and given to patient. The patient verbalized understanding and acceptance of instructions. Dr. Garcia Salcido discussed with patient procedure findings and next steps. [FreeTextEntry1] : Patient will contact relevant pharmacies regarding medications.  Continue with exercise routine and making healthy dietary choices.

## 2025-06-24 ENCOUNTER — TRANSCRIBE ORDERS (OUTPATIENT)
Facility: HOSPITAL | Age: 65
End: 2025-06-24

## 2025-06-24 DIAGNOSIS — Z12.31 OTHER SCREENING MAMMOGRAM: Primary | ICD-10-CM

## 2025-07-23 ENCOUNTER — HOSPITAL ENCOUNTER (OUTPATIENT)
Facility: HOSPITAL | Age: 65
Discharge: HOME | End: 2025-07-26
Payer: COMMERCIAL

## 2025-07-23 DIAGNOSIS — Z12.31 OTHER SCREENING MAMMOGRAM: ICD-10-CM

## 2025-07-23 PROCEDURE — 77063 BREAST TOMOSYNTHESIS BI: CPT

## (undated) DEVICE — 1200 GUARD II KIT W/5MM TUBE W/O VAC TUBE: Brand: GUARDIAN

## (undated) DEVICE — CANNULA CUSH AD W/ 14FT TBG

## (undated) DEVICE — SYR 5ML 1/5 GRAD LL NSAF LF --

## (undated) DEVICE — SYR 3ML LL TIP 1/10ML GRAD --

## (undated) DEVICE — NDL PRT INJ NSAF BLNT 18GX1.5 --

## (undated) DEVICE — Device

## (undated) DEVICE — BAG SPEC BIOHZRD 10 X 10 IN --

## (undated) DEVICE — ELECTRODE,RADIOTRANSLUCENT,FOAM,3PK: Brand: MEDLINE

## (undated) DEVICE — CATH IV AUTOGRD BC BLU 22GA 25 -- INSYTE

## (undated) DEVICE — NDL FLTR TIP 5 MIC 18GX1.5IN --

## (undated) DEVICE — BASIN EMSIS 16OZ GRAPHITE PLAS KID SHP MOLD GRAD FOR ORAL

## (undated) DEVICE — CONTAINER SPEC 20 ML LID NEUT BUFF FORMALIN 10 % POLYPR STS

## (undated) DEVICE — BAG BELONG PT PERS CLEAR HANDL

## (undated) DEVICE — SOLIDIFIER MEDC 1200ML -- CONVERT TO 356117

## (undated) DEVICE — KIT COLON W/ 1.1OZ LUB AND 2 END

## (undated) DEVICE — BITEBLOCK ENDOSCP 60FR MAXI WHT POLYETH STURDY W/ VELC WVN

## (undated) DEVICE — SET ADMIN 16ML TBNG L100IN 2 Y INJ SITE IV PIGGY BK DISP

## (undated) DEVICE — FORCEPS BX L240CM JAW DIA2.8MM L CAP W/ NDL MIC MESH TOOTH